# Patient Record
Sex: MALE | Race: WHITE | NOT HISPANIC OR LATINO | URBAN - METROPOLITAN AREA
[De-identification: names, ages, dates, MRNs, and addresses within clinical notes are randomized per-mention and may not be internally consistent; named-entity substitution may affect disease eponyms.]

---

## 2024-09-03 DIAGNOSIS — M25.561 RIGHT KNEE PAIN, UNSPECIFIED CHRONICITY: ICD-10-CM

## 2024-09-04 ENCOUNTER — OFFICE VISIT (OUTPATIENT)
Dept: ORTHOPEDIC SURGERY | Facility: CLINIC | Age: 44
End: 2024-09-04
Payer: COMMERCIAL

## 2024-09-04 ENCOUNTER — HOSPITAL ENCOUNTER (OUTPATIENT)
Dept: RADIOLOGY | Facility: CLINIC | Age: 44
Discharge: HOME | End: 2024-09-04
Payer: COMMERCIAL

## 2024-09-04 DIAGNOSIS — M25.462 EFFUSION OF LEFT KNEE JOINT: Primary | ICD-10-CM

## 2024-09-04 DIAGNOSIS — M25.561 RIGHT KNEE PAIN, UNSPECIFIED CHRONICITY: ICD-10-CM

## 2024-09-04 PROCEDURE — 73564 X-RAY EXAM KNEE 4 OR MORE: CPT | Mod: LEFT SIDE | Performed by: RADIOLOGY

## 2024-09-04 PROCEDURE — 73564 X-RAY EXAM KNEE 4 OR MORE: CPT | Mod: LT

## 2024-09-04 PROCEDURE — 99204 OFFICE O/P NEW MOD 45 MIN: CPT | Performed by: EMERGENCY MEDICINE

## 2024-09-04 RX ORDER — MELOXICAM 15 MG/1
15 TABLET ORAL DAILY
Qty: 30 TABLET | Refills: 0 | Status: SHIPPED | OUTPATIENT
Start: 2024-09-04 | End: 2024-10-04

## 2024-09-04 ASSESSMENT — PAIN DESCRIPTION - DESCRIPTORS: DESCRIPTORS: OTHER (COMMENT);PRESSURE

## 2024-09-04 ASSESSMENT — PAIN SCALES - GENERAL: PAINLEVEL_OUTOF10: 8

## 2024-09-04 ASSESSMENT — PAIN - FUNCTIONAL ASSESSMENT: PAIN_FUNCTIONAL_ASSESSMENT: 0-10

## 2024-09-04 NOTE — PROGRESS NOTES
Subjective    Patient ID: Nawaf Way is a 44 y.o. male.    Chief Complaint: Pain of the Left Knee (NPV - No injury - pain for a few yrs. )     Last Surgery: No surgery found  Last Surgery Date: No surgery found    Nawaf is a very pleasant 44-year-old male coming in with acute on chronic left knee discomfort and swelling.  He played sports in high school but denies a specific traumatic event or known injury.  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  It does not radiate but is associated with knee swelling.  He has some knee swelling here today that has gotten better over the past couple of days since he has been taking occasional ibuprofen and resting.  He would be interested in surgery if indicated.  He denies any infectious symptoms.  No weakness or numbness.  No other complaints or today.        Objective   Right Knee Exam   Right knee exam is normal.      Left Knee Exam     Muscle Strength   The patient has normal left knee strength.    Tenderness   The patient is experiencing no tenderness.     Range of Motion   Extension:  normal   Flexion:  abnormal     Tests   Celio:   Lateral - positive  Varus: negative Valgus: negative  Lachman:  Anterior - negative      Drawer:  Anterior - negative     Posterior - negative  Patellar apprehension: negative    Other   Erythema: absent  Sensation: normal  Pulse: present  Swelling: moderate  Effusion: effusion present    Comments:  Knee extension is intact against resistance            Image Results:  OUTSIDE GENERIC TESTING  Ordered by an unspecified provider.    X-rays of the left knee were reviewed and interpreted by me on 9/4/2024 and were grossly unremarkable without any evidence of acute injuries or fractures.    Assessment/Plan   Encounter Diagnoses:  Effusion of left knee  joint    Orders Placed This Encounter    MR knee left wo IV contrast    Referral to Physical Therapy    meloxicam (Mobic) 15 mg tablet     No follow-ups on file.    We had a long discussion about his treatment options and eventually agreed to start him on meloxicam and refer him to physical therapy.  However because of his knee effusion I am concerned about a potential surgical issue, specifically tearing to his lateral meniscus.  We therefore agreed to obtain an MRI and I will refer him to orthopedic sports medicine, likely Dr. CARLA deutsch.  I will follow-up with him after his MRI.    ** Please excuse any errors in grammar or translation related to this dictation. Voice recognition software was utilized to prepare this document. **       Eusebio Oropeza MD  [facility] Medicine

## 2024-09-09 ENCOUNTER — DOCUMENTATION (OUTPATIENT)
Dept: ORTHOPEDIC SURGERY | Facility: CLINIC | Age: 44
End: 2024-09-09
Payer: COMMERCIAL

## 2024-09-09 NOTE — PROGRESS NOTES
I called and did a peer to peer to get approval for the left knee MRI.  The approval number is [id number] and is good from 9/5/2024 through 10/4/2024.     I updated my , who will inform the patient of the approval.    ** Please excuse any errors in grammar or translation related to this dictation. Voice recognition software was utilized to prepare this document. **       Eusebio Oropeza MD  [facility] Medicine

## 2024-09-18 ENCOUNTER — TELEMEDICINE (OUTPATIENT)
Dept: ORTHOPEDIC SURGERY | Facility: CLINIC | Age: 44
End: 2024-09-18
Payer: COMMERCIAL

## 2024-09-18 ENCOUNTER — HOSPITAL ENCOUNTER (OUTPATIENT)
Dept: RADIOLOGY | Facility: HOSPITAL | Age: 44
Discharge: HOME | End: 2024-09-18
Payer: COMMERCIAL

## 2024-09-18 DIAGNOSIS — S83.242D ACUTE MEDIAL MENISCUS TEAR OF LEFT KNEE, SUBSEQUENT ENCOUNTER: Primary | ICD-10-CM

## 2024-09-18 DIAGNOSIS — M25.462 EFFUSION OF LEFT KNEE JOINT: ICD-10-CM

## 2024-09-18 PROCEDURE — 99441 PR PHYS/QHP TELEPHONE EVALUATION 5-10 MIN: CPT | Performed by: EMERGENCY MEDICINE

## 2024-09-18 PROCEDURE — 73721 MRI JNT OF LWR EXTRE W/O DYE: CPT | Mod: LT

## 2024-09-18 PROCEDURE — 73721 MRI JNT OF LWR EXTRE W/O DYE: CPT | Mod: LEFT SIDE | Performed by: STUDENT IN AN ORGANIZED HEALTH CARE EDUCATION/TRAINING PROGRAM

## 2024-09-18 NOTE — PROGRESS NOTES
Subjective    Patient ID: Nawaf Way is a 44 y.o. male.    Chief Complaint: No chief complaint on file.     Last Surgery: No surgery found  Last Surgery Date: No surgery found    Nawaf is a very pleasant 44-year-old male coming in with acute on chronic left knee discomfort and swelling.  He played sports in high school but denies a specific traumatic event or known injury.  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  It does not radiate but is associated with knee swelling.  He has some knee swelling here today that has gotten better over the past couple of days since he has been taking occasional ibuprofen and resting.  He would be interested in surgery if indicated.  He denies any infectious symptoms.  No weakness or numbness.  No other complaints or today. We agreed to start him on meloxicam and refer him to physical therapy.  However because of his knee effusion I am concerned about a potential surgical issue, specifically tearing to his lateral meniscus.  We therefore agreed to obtain an MRI and I will refer him to orthopedic sports medicine, likely Dr. CARLA deutsch.  I will follow-up with him after his MRI.    Update on 9/18/2024.  Nawaf did a virtual visit with me today with audio capabilities only to discuss his MRI result and follow-up regarding his left knee pain.  His MRI demonstrated a medial meniscus tear.  He is interested in surgery.        Objective   Ortho Exam  No exam, televisit    Image Results:  MR knee left wo IV contrast  Narrative: Interpreted By:  Jarrell Kumari,  and Lesley Cabrera   STUDY:  MRI of the  left knee without IV contrast;  9/18/2024 10:38 am      INDICATION:  Signs/Symptoms:L knee effusion, positive ana testing on the L  side.      ,M25.462 Effusion, left knee      COMPARISON:  Left knee  radiograph 09/04/2024.      ACCESSION NUMBER(S):  [id number]      ORDERING CLINICIAN:  GAURAV PRYOR      TECHNIQUE:  MR imaging of the  left knee was obtained  without IV contrast.      FINDINGS:  LIGAMENTS AND TENDONS:  The anterior cruciate ligament and the posterior cruciate ligaments  are intact. The medial collateral ligament is intact. The lateral  collateral ligament, the biceps femoris tendon, the popliteus tendon  and the iliotibial band are intact. The quadriceps tendon and the  patellar tendon are intact.      MENISCI:  Complex tear involving the posterior horn and body of the medial  meniscus with horizontal and radial components. There is a  parameniscal cyst emanating inferiorly adjacent to the medial tibial  plateau The lateral meniscus is intact and without evidence of tear.      JOINTS:  Mild diffuse articular cartilage thinning of the medial femoral  condyle and medial tibial plateau without evidence of full thickness  defect. Focal mild to moderate articular cartilage thinning involving  the anterior weight-bearing surface of the lateral femoral condyle.  The articular cartilage of the lateral tibial plateau is intact. No  full-thickness defect. The patellofemoral articulation is intact and  without evidence of subluxation or articular cartilage defect.  Moderate-sized joint effusion. No significant popliteal cyst. Mild  prepatellar soft tissue edema.      OSSEOUS STRUCTURES:  No focal marrow replacing lesions are identified. There is no  fracture.      SOFT TISSUES:  There is no muscle atrophy or tear.  The common peroneal nerve is intact.      Impression: 1. Horizontal tear involving the posterior horn and body of the  medial meniscus. There is an adjacent parameniscal cyst.  2. Moderate-sized knee joint effusion.  3. Mild degenerative changes of the knee joint with superimposed  focal mild to moderate cartilage thinning at the lateral joint  compartment.  4. Mild prepatellar soft tissue  edema.      I personally reviewed the images/study and I agree with the findings  as stated by Rick Sutton MD. This study was interpreted at  [facility], [place], [place]      MACRO:  None      Signed by: Jarrell Kumari 9/18/2024 11:36 AM  Dictation workstation:   [id number]    MRI of the left knee was reviewed and interpreted by me on 9/18/2024.  I agree with the radiologist's findings and interpretations.    Assessment/Plan   Encounter Diagnoses:  Acute medial meniscus tear of left knee, subsequent encounter    No orders of the defined types were placed in this encounter.    No follow-ups on file.    We discussed his treatment options and decided to refer him to orthopedic surgery to see Dr. Proctor he will follow-up with me as needed moving forward.[email or web address].     I spent 7 min with the patient reviewing his imaging results and the chart.  Greater than 50% of the time was spent with the patient discussing the results and coming up with the treatment plan.    ** Please excuse any errors in grammar or translation related to this dictation. Voice recognition software was utilized to prepare this document. **       Eusebio Oropeza MD  [facility] Sports Medicine

## 2024-09-19 ENCOUNTER — APPOINTMENT (OUTPATIENT)
Dept: RADIOLOGY | Facility: CLINIC | Age: 44
End: 2024-09-19
Payer: COMMERCIAL

## 2024-09-24 ENCOUNTER — TELEPHONE (OUTPATIENT)
Dept: ORTHOPEDIC SURGERY | Facility: HOSPITAL | Age: 44
End: 2024-09-24
Payer: COMMERCIAL

## 2024-09-24 NOTE — TELEPHONE ENCOUNTER
I left a voicemail for Mr. Way to call the office back to r/s his appointment to the morning of 9/26/24 or to a different day because the doctor has emergent surgery in the afternoon on 9/26/24.

## 2024-09-26 ENCOUNTER — APPOINTMENT (OUTPATIENT)
Dept: ORTHOPEDIC SURGERY | Facility: CLINIC | Age: 44
End: 2024-09-26
Payer: COMMERCIAL

## 2024-09-26 ENCOUNTER — OFFICE VISIT (OUTPATIENT)
Dept: ORTHOPEDIC SURGERY | Facility: CLINIC | Age: 44
End: 2024-09-26
Payer: COMMERCIAL

## 2024-09-26 DIAGNOSIS — M23.322 MEDIAL MENISCUS, POSTERIOR HORN DERANGEMENT, LEFT: Primary | ICD-10-CM

## 2024-09-26 PROCEDURE — 99204 OFFICE O/P NEW MOD 45 MIN: CPT | Performed by: STUDENT IN AN ORGANIZED HEALTH CARE EDUCATION/TRAINING PROGRAM

## 2024-09-26 ASSESSMENT — PAIN - FUNCTIONAL ASSESSMENT: PAIN_FUNCTIONAL_ASSESSMENT: 0-10

## 2024-09-26 ASSESSMENT — PAIN SCALES - GENERAL: PAINLEVEL_OUTOF10: 0 - NO PAIN

## 2024-10-08 ENCOUNTER — PREP FOR PROCEDURE (OUTPATIENT)
Dept: ORTHOPEDIC SURGERY | Facility: CLINIC | Age: 44
End: 2024-10-08
Payer: COMMERCIAL

## 2024-10-08 PROBLEM — M23.322 MEDIAL MENISCUS, POSTERIOR HORN DERANGEMENT, LEFT: Status: ACTIVE | Noted: 2024-09-26

## 2024-10-09 NOTE — PROGRESS NOTES
PRIMARY CARE PHYSICIAN: No primary care provider on file.  REFERRING PROVIDER: No referring provider defined for this encounter.     CONSULT ORTHOPAEDIC: Knee Evaluation    ASSESSMENT & PLAN    Impression/Plan: This is a 44-year-old male presenting for evaluation of acute on chronic left knee pain, discomfort, and swelling.  Based on clinical history, physical examination, and MRI he is suffering from a medial meniscus posterior horn meniscal tear.  At this time based on his mechanical symptoms I recommended operative intervention.  Risks of the surgery include but are not limited to blood loss, infection, nerve damage, persistent pain, versus instability.  At this time, the patient would like to move forward with surgery.        SUBJECTIVE  CHIEF COMPLAINT:   Chief Complaint   Patient presents with    Left Knee - Pain     NPV - No injury had MRI done - Ref by Dr. Oropeza.          HPI: Nawaf aWy is a 44 y.o. patient. The patient presents for evaluation of acute on chronic left knee medial sided pain and mechanical symptoms.  He does a lot of work manual labor and reports that after a twisting injury his pain reaches a 5/10.  He does have mechanical symptoms of clicking, popping and locking.  He has pain over the medial joint line.  No previous history of knee surgery, injections, or traumatic injury.  He denies any distal numbness or tingling.      REVIEW OF SYSTEMS  Constitutional: See HPI for pain assessment, No significant weight loss, recent trauma  Cardiovascular: No chest pain, shortness of breath  Respiratory: No difficulty breathing, cough  Gastrointestinal: No nausea, vomiting, diarrhea, constipation  Musculoskeletal: Noted in HPI, positive for pain, restricted motion, stiffness  Integumentary: No rashes, easy bruising, redness   Neurological: no numbness or tingling in extremities, no gait disturbances   Psychiatric: No mood changes, memory changes, social issues  Heme/Lymph: no excessive  "swelling, easy bruising, excessive bleeding  ENT: No hearing changes  Eyes: No vision changes    No past medical history on file.     No Known Allergies     Past Surgical History:   Procedure Laterality Date    OTHER SURGICAL HISTORY  11/05/2020    Vasectomy        No family history on file.     Social History     Socioeconomic History    Marital status:      Spouse name: Not on file    Number of children: Not on file    Years of education: Not on file    Highest education level: Not on file   Occupational History    Not on file   Tobacco Use    Smoking status: Not on file    Smokeless tobacco: Not on file   Substance and Sexual Activity    Alcohol use: Not on file    Drug use: Not on file    Sexual activity: Not on file   Other Topics Concern    Not on file   Social History Narrative    Not on file     Social Determinants of Health     Financial Resource Strain: Not on file   Food Insecurity: Not on file   Transportation Needs: Not on file   Physical Activity: Not on file   Stress: Not on file   Social Connections: Not on file   Intimate Partner Violence: Not on file   Housing Stability: Not on file        CURRENT MEDICATIONS:   No current outpatient medications on file.     No current facility-administered medications for this visit.        OBJECTIVE    PHYSICAL EXAM      11/5/2020     3:41 PM 12/2/2020    10:40 AM   Vitals   Systolic 112 108   Diastolic 78 70   Heart Rate 107 79   Height (in) 1.778 m (5' 10\") 1.778 m (5' 10\")   Weight (lb) 206.38 207.13   BMI 29.61 kg/m2 29.72 kg/m2   BSA (m2) 2.15 m2 2.15 m2      There is no height or weight on file to calculate BMI.    GENERAL: A/Ox3, NAD. Appears healthy, well nourished  PSYCHIATRIC: Mood stable, appropriate memory recall  EYES: EOM intact, no scleral icterus  CARDIAC: regular rate  LUNGS: Breathing non-labored  SKIN: no erythema, rashes, or ecchymoses     MUSCULOSKELETAL:  Laterality: left Knee Exam  This is a well-appearing patient in no acute " distress.  There is mild effusion to the knee.  There is moderate tenderness to palpation along the medial joint line, no with pain along the medial joint line and terminal flexion.  Tenderness to palpation along lateral joint line.  Range of motion: 0 to 120 degrees with pain along the medial joint line and terminal flexion.  There is no pain with manipulation of patella.  Negative Lachman's exam.  The knee is stable to posterior stress.  The knee is stable to varus and valgus stress at both 0 and 30 degrees knee flexion.  5/5 Strength TA, EHL, GS    NEUROVASCULAR:  - Neurovascular Status: sensation intact to light touch distally, lower extremity motor intact  - Capillary refill brisk at extremities, Bilateral dorsalis pedis pulse 2+    Imaging:  MRI of the left knee dated 9/18/2024 reveals horizontal tearing the posterior horn the medial meniscus.  No evidence of ACL, PCL, LCL, or MCL instability.          Piter Miller MD, MPH  Attending Surgeon  Sports Medicine Orthopaedic Surgery  [facility] [place]

## 2024-10-29 ENCOUNTER — ANESTHESIA EVENT (OUTPATIENT)
Dept: OPERATING ROOM | Facility: CLINIC | Age: 44
End: 2024-10-29
Payer: COMMERCIAL

## 2024-10-30 ENCOUNTER — HOSPITAL ENCOUNTER (OUTPATIENT)
Facility: CLINIC | Age: 44
Setting detail: OUTPATIENT SURGERY
Discharge: HOME | End: 2024-10-30
Attending: STUDENT IN AN ORGANIZED HEALTH CARE EDUCATION/TRAINING PROGRAM | Admitting: STUDENT IN AN ORGANIZED HEALTH CARE EDUCATION/TRAINING PROGRAM
Payer: COMMERCIAL

## 2024-10-30 ENCOUNTER — ANESTHESIA (OUTPATIENT)
Dept: OPERATING ROOM | Facility: CLINIC | Age: 44
End: 2024-10-30
Payer: COMMERCIAL

## 2024-10-30 VITALS
BODY MASS INDEX: 30.8 KG/M2 | WEIGHT: 215.17 LBS | HEIGHT: 70 IN | OXYGEN SATURATION: 100 % | RESPIRATION RATE: 16 BRPM | HEART RATE: 55 BPM | SYSTOLIC BLOOD PRESSURE: 115 MMHG | DIASTOLIC BLOOD PRESSURE: 84 MMHG | TEMPERATURE: 96.8 F

## 2024-10-30 DIAGNOSIS — G89.18 POSTOPERATIVE PAIN: ICD-10-CM

## 2024-10-30 DIAGNOSIS — M23.322 MEDIAL MENISCUS, POSTERIOR HORN DERANGEMENT, LEFT: Primary | ICD-10-CM

## 2024-10-30 PROCEDURE — 7100000002 HC RECOVERY ROOM TIME - EACH INCREMENTAL 1 MINUTE: Performed by: STUDENT IN AN ORGANIZED HEALTH CARE EDUCATION/TRAINING PROGRAM

## 2024-10-30 PROCEDURE — 2500000004 HC RX 250 GENERAL PHARMACY W/ HCPCS (ALT 636 FOR OP/ED): Performed by: STUDENT IN AN ORGANIZED HEALTH CARE EDUCATION/TRAINING PROGRAM

## 2024-10-30 PROCEDURE — 2720000007 HC OR 272 NO HCPCS: Performed by: STUDENT IN AN ORGANIZED HEALTH CARE EDUCATION/TRAINING PROGRAM

## 2024-10-30 PROCEDURE — 7100000009 HC PHASE TWO TIME - INITIAL BASE CHARGE: Performed by: STUDENT IN AN ORGANIZED HEALTH CARE EDUCATION/TRAINING PROGRAM

## 2024-10-30 PROCEDURE — 7100000010 HC PHASE TWO TIME - EACH INCREMENTAL 1 MINUTE: Performed by: STUDENT IN AN ORGANIZED HEALTH CARE EDUCATION/TRAINING PROGRAM

## 2024-10-30 PROCEDURE — 3600000004 HC OR TIME - INITIAL BASE CHARGE - PROCEDURE LEVEL FOUR: Performed by: STUDENT IN AN ORGANIZED HEALTH CARE EDUCATION/TRAINING PROGRAM

## 2024-10-30 PROCEDURE — 3700000001 HC GENERAL ANESTHESIA TIME - INITIAL BASE CHARGE: Performed by: STUDENT IN AN ORGANIZED HEALTH CARE EDUCATION/TRAINING PROGRAM

## 2024-10-30 PROCEDURE — 3600000009 HC OR TIME - EACH INCREMENTAL 1 MINUTE - PROCEDURE LEVEL FOUR: Performed by: STUDENT IN AN ORGANIZED HEALTH CARE EDUCATION/TRAINING PROGRAM

## 2024-10-30 PROCEDURE — A29881 PR KNEE SCOPE,MED/LAT MENISECTOMY: Performed by: NURSE ANESTHETIST, CERTIFIED REGISTERED

## 2024-10-30 PROCEDURE — 2500000004 HC RX 250 GENERAL PHARMACY W/ HCPCS (ALT 636 FOR OP/ED): Performed by: NURSE ANESTHETIST, CERTIFIED REGISTERED

## 2024-10-30 PROCEDURE — A29881 PR KNEE SCOPE,MED/LAT MENISECTOMY: Performed by: ANESTHESIOLOGY

## 2024-10-30 PROCEDURE — 3700000002 HC GENERAL ANESTHESIA TIME - EACH INCREMENTAL 1 MINUTE: Performed by: STUDENT IN AN ORGANIZED HEALTH CARE EDUCATION/TRAINING PROGRAM

## 2024-10-30 PROCEDURE — 29881 ARTHRS KNE SRG MNISECTMY M/L: CPT | Performed by: STUDENT IN AN ORGANIZED HEALTH CARE EDUCATION/TRAINING PROGRAM

## 2024-10-30 PROCEDURE — 7100000001 HC RECOVERY ROOM TIME - INITIAL BASE CHARGE: Performed by: STUDENT IN AN ORGANIZED HEALTH CARE EDUCATION/TRAINING PROGRAM

## 2024-10-30 RX ORDER — ACETAMINOPHEN 325 MG/1
650 TABLET ORAL EVERY 4 HOURS PRN
Status: DISCONTINUED | OUTPATIENT
Start: 2024-10-30 | End: 2024-10-30 | Stop reason: HOSPADM

## 2024-10-30 RX ORDER — ONDANSETRON HYDROCHLORIDE 2 MG/ML
4 INJECTION, SOLUTION INTRAVENOUS ONCE AS NEEDED
Status: DISCONTINUED | OUTPATIENT
Start: 2024-10-30 | End: 2024-10-30 | Stop reason: HOSPADM

## 2024-10-30 RX ORDER — SODIUM CHLORIDE, SODIUM LACTATE, POTASSIUM CHLORIDE, CALCIUM CHLORIDE 600; 310; 30; 20 MG/100ML; MG/100ML; MG/100ML; MG/100ML
INJECTION, SOLUTION INTRAVENOUS CONTINUOUS PRN
Status: DISCONTINUED | OUTPATIENT
Start: 2024-10-30 | End: 2024-10-30

## 2024-10-30 RX ORDER — ONDANSETRON 4 MG/1
4 TABLET, FILM COATED ORAL EVERY 8 HOURS PRN
Qty: 20 TABLET | Refills: 0 | Status: SHIPPED | OUTPATIENT
Start: 2024-10-30 | End: 2024-11-06

## 2024-10-30 RX ORDER — DIPHENHYDRAMINE HYDROCHLORIDE 50 MG/ML
12.5 INJECTION INTRAMUSCULAR; INTRAVENOUS ONCE AS NEEDED
Status: DISCONTINUED | OUTPATIENT
Start: 2024-10-30 | End: 2024-10-30 | Stop reason: HOSPADM

## 2024-10-30 RX ORDER — ASPIRIN 325 MG
325 TABLET, DELAYED RELEASE (ENTERIC COATED) ORAL DAILY
Qty: 14 TABLET | Refills: 0 | Status: SHIPPED | OUTPATIENT
Start: 2024-10-30

## 2024-10-30 RX ORDER — SODIUM CHLORIDE, SODIUM LACTATE, POTASSIUM CHLORIDE, CALCIUM CHLORIDE 600; 310; 30; 20 MG/100ML; MG/100ML; MG/100ML; MG/100ML
100 INJECTION, SOLUTION INTRAVENOUS CONTINUOUS
Status: SHIPPED | OUTPATIENT
Start: 2024-10-30 | End: 2024-10-30

## 2024-10-30 RX ORDER — ALBUTEROL SULFATE 0.83 MG/ML
2.5 SOLUTION RESPIRATORY (INHALATION) ONCE AS NEEDED
Status: DISCONTINUED | OUTPATIENT
Start: 2024-10-30 | End: 2024-10-30 | Stop reason: HOSPADM

## 2024-10-30 RX ORDER — DOCUSATE SODIUM 100 MG/1
100 CAPSULE, LIQUID FILLED ORAL 2 TIMES DAILY PRN
Qty: 14 CAPSULE | Refills: 0 | Status: SHIPPED | OUTPATIENT
Start: 2024-10-30 | End: 2024-11-06

## 2024-10-30 RX ORDER — SODIUM CHLORIDE, SODIUM LACTATE, POTASSIUM CHLORIDE, AND CALCIUM CHLORIDE .6; .31; .03; .02 G/100ML; G/100ML; G/100ML; G/100ML
IRRIGANT IRRIGATION AS NEEDED
Status: DISCONTINUED | OUTPATIENT
Start: 2024-10-30 | End: 2024-10-30 | Stop reason: HOSPADM

## 2024-10-30 RX ORDER — ONDANSETRON 4 MG/1
4 TABLET, FILM COATED ORAL EVERY 8 HOURS PRN
Qty: 21 TABLET | Refills: 0 | Status: SHIPPED | OUTPATIENT
Start: 2024-10-30 | End: 2024-11-06

## 2024-10-30 RX ORDER — OXYCODONE HYDROCHLORIDE 5 MG/1
5 TABLET ORAL EVERY 6 HOURS PRN
Qty: 29 TABLET | Refills: 0 | Status: SHIPPED | OUTPATIENT
Start: 2024-10-30 | End: 2024-11-06

## 2024-10-30 RX ORDER — NAPROXEN SODIUM 220 MG/1
81 TABLET, FILM COATED ORAL 2 TIMES DAILY
Qty: 56 TABLET | Refills: 0 | Status: SHIPPED | OUTPATIENT
Start: 2024-10-30 | End: 2024-11-27

## 2024-10-30 RX ORDER — GLYCOPYRROLATE 0.2 MG/ML
INJECTION INTRAMUSCULAR; INTRAVENOUS AS NEEDED
Status: DISCONTINUED | OUTPATIENT
Start: 2024-10-30 | End: 2024-10-30

## 2024-10-30 RX ORDER — OXYCODONE HYDROCHLORIDE 5 MG/1
5 TABLET ORAL EVERY 4 HOURS PRN
Status: DISCONTINUED | OUTPATIENT
Start: 2024-10-30 | End: 2024-10-30 | Stop reason: HOSPADM

## 2024-10-30 RX ORDER — ONDANSETRON HYDROCHLORIDE 2 MG/ML
INJECTION, SOLUTION INTRAVENOUS AS NEEDED
Status: DISCONTINUED | OUTPATIENT
Start: 2024-10-30 | End: 2024-10-30

## 2024-10-30 RX ORDER — DOCUSATE SODIUM 100 MG/1
100 CAPSULE, LIQUID FILLED ORAL 2 TIMES DAILY
Qty: 60 CAPSULE | Refills: 0 | Status: SHIPPED | OUTPATIENT
Start: 2024-10-30 | End: 2024-11-29

## 2024-10-30 RX ORDER — LIDOCAINE HYDROCHLORIDE 10 MG/ML
INJECTION, SOLUTION INFILTRATION; PERINEURAL AS NEEDED
Status: DISCONTINUED | OUTPATIENT
Start: 2024-10-30 | End: 2024-10-30 | Stop reason: HOSPADM

## 2024-10-30 RX ORDER — BUPIVACAINE HYDROCHLORIDE 2.5 MG/ML
INJECTION, SOLUTION EPIDURAL; INFILTRATION; INTRACAUDAL AS NEEDED
Status: DISCONTINUED | OUTPATIENT
Start: 2024-10-30 | End: 2024-10-30 | Stop reason: HOSPADM

## 2024-10-30 RX ORDER — LIDOCAINE HYDROCHLORIDE 10 MG/ML
0.1 INJECTION, SOLUTION EPIDURAL; INFILTRATION; INTRACAUDAL; PERINEURAL ONCE
Status: DISCONTINUED | OUTPATIENT
Start: 2024-10-30 | End: 2024-10-30 | Stop reason: HOSPADM

## 2024-10-30 RX ORDER — LABETALOL HYDROCHLORIDE 5 MG/ML
5 INJECTION, SOLUTION INTRAVENOUS ONCE AS NEEDED
Status: DISCONTINUED | OUTPATIENT
Start: 2024-10-30 | End: 2024-10-30 | Stop reason: HOSPADM

## 2024-10-30 RX ORDER — KETOROLAC TROMETHAMINE 30 MG/ML
INJECTION, SOLUTION INTRAMUSCULAR; INTRAVENOUS AS NEEDED
Status: DISCONTINUED | OUTPATIENT
Start: 2024-10-30 | End: 2024-10-30

## 2024-10-30 RX ORDER — METHOCARBAMOL 100 MG/ML
INJECTION, SOLUTION INTRAMUSCULAR; INTRAVENOUS AS NEEDED
Status: DISCONTINUED | OUTPATIENT
Start: 2024-10-30 | End: 2024-10-30

## 2024-10-30 RX ORDER — HYDROMORPHONE HYDROCHLORIDE 1 MG/ML
0.2 INJECTION, SOLUTION INTRAMUSCULAR; INTRAVENOUS; SUBCUTANEOUS EVERY 5 MIN PRN
Status: DISCONTINUED | OUTPATIENT
Start: 2024-10-30 | End: 2024-10-30 | Stop reason: HOSPADM

## 2024-10-30 RX ORDER — MIDAZOLAM HYDROCHLORIDE 1 MG/ML
INJECTION, SOLUTION INTRAMUSCULAR; INTRAVENOUS AS NEEDED
Status: DISCONTINUED | OUTPATIENT
Start: 2024-10-30 | End: 2024-10-30

## 2024-10-30 RX ORDER — LIDOCAINE HYDROCHLORIDE 20 MG/ML
INJECTION, SOLUTION INFILTRATION; PERINEURAL AS NEEDED
Status: DISCONTINUED | OUTPATIENT
Start: 2024-10-30 | End: 2024-10-30

## 2024-10-30 RX ORDER — PROPOFOL 10 MG/ML
INJECTION, EMULSION INTRAVENOUS AS NEEDED
Status: DISCONTINUED | OUTPATIENT
Start: 2024-10-30 | End: 2024-10-30

## 2024-10-30 RX ORDER — HYDROMORPHONE HYDROCHLORIDE 1 MG/ML
0.5 INJECTION, SOLUTION INTRAMUSCULAR; INTRAVENOUS; SUBCUTANEOUS EVERY 5 MIN PRN
Status: DISCONTINUED | OUTPATIENT
Start: 2024-10-30 | End: 2024-10-30 | Stop reason: HOSPADM

## 2024-10-30 RX ORDER — FENTANYL CITRATE 50 UG/ML
INJECTION, SOLUTION INTRAMUSCULAR; INTRAVENOUS AS NEEDED
Status: DISCONTINUED | OUTPATIENT
Start: 2024-10-30 | End: 2024-10-30

## 2024-10-30 RX ORDER — OXYCODONE HYDROCHLORIDE 5 MG/1
5 TABLET ORAL EVERY 4 HOURS PRN
Qty: 15 TABLET | Refills: 0 | Status: SHIPPED | OUTPATIENT
Start: 2024-10-30 | End: 2024-11-06

## 2024-10-30 RX ORDER — ACETAMINOPHEN 500 MG
1000 TABLET ORAL EVERY 8 HOURS PRN
Qty: 180 TABLET | Refills: 0 | Status: SHIPPED | OUTPATIENT
Start: 2024-10-30 | End: 2024-11-29

## 2024-10-30 RX ORDER — CEFAZOLIN 1 G/1
INJECTION, POWDER, FOR SOLUTION INTRAVENOUS AS NEEDED
Status: DISCONTINUED | OUTPATIENT
Start: 2024-10-30 | End: 2024-10-30

## 2024-10-30 ASSESSMENT — PAIN - FUNCTIONAL ASSESSMENT
PAIN_FUNCTIONAL_ASSESSMENT: 0-10

## 2024-10-30 ASSESSMENT — PAIN DESCRIPTION - DESCRIPTORS: DESCRIPTORS: SORE

## 2024-10-30 ASSESSMENT — PAIN SCALES - GENERAL
PAINLEVEL_OUTOF10: 0 - NO PAIN
PAINLEVEL_OUTOF10: 0 - NO PAIN
PAINLEVEL_OUTOF10: 4
PAINLEVEL_OUTOF10: 0 - NO PAIN
PAINLEVEL_OUTOF10: 0 - NO PAIN

## 2024-10-30 ASSESSMENT — COLUMBIA-SUICIDE SEVERITY RATING SCALE - C-SSRS
2. HAVE YOU ACTUALLY HAD ANY THOUGHTS OF KILLING YOURSELF?: NO
1. IN THE PAST MONTH, HAVE YOU WISHED YOU WERE DEAD OR WISHED YOU COULD GO TO SLEEP AND NOT WAKE UP?: NO
6. HAVE YOU EVER DONE ANYTHING, STARTED TO DO ANYTHING, OR PREPARED TO DO ANYTHING TO END YOUR LIFE?: NO

## 2024-10-30 NOTE — DISCHARGE INSTRUCTIONS
Post Op Instructions: Knee/Lower Extremity  Piter Miller M.D., M.P.H.  Office Phone: [phone]  After Hours Line: [phone]      First PT Appointment:   Will discuss.  First Post Op Appointment:  11/12/2024 at 110PM [facility]     If you have any questions, please read this information in its entirety, and then call with any questions.        MEDICATIONS PRESCRIBED FOR AFTER SURGERY:  Your preferred pharmacy on file is where your medications have been ePrescribed: Please  ASAP  FOR PAIN RELIEF (Narcotics):    You have been provided pain medication after your surgery, please take as directed. Wean off of narcotics as soon as symptoms allow. Take with food. Notify office if nausea, vomiting, headaches, or rash occurs. Other side effects include; drowsiness and constipation.    Use caution when taking TYLENOL or other acetaminophen products while taking Percocet, Norco, or Lorcet as these medications already contain acetaminophen. Do NOT exceed more than 3000mg of Tylenol per day.    We do NOT recommend ibuprofen, Advil, Aleve, Motrin, or NSAID products for 6wks after surgery, as these can delay healing.    You should not drive while taking pain medications as they delay your responses.    Narcotics are addictive and have a high abuse and overdose potential. It is extremely important to take these as directed and not to mix with alcohol or other forms of medication unless approved by the medical team. Please keep all prescribed narcotics LOCKED and away from children. Lastly, please properly dispose of leftover narcotics. Contact your local police department for more info.   o OXYCODONE 5 mg: this is a short-acting medication for pain. You should take 1 or 2 tablets every 4 to 6 hours AS NEEDED. If you are not in pain, you should not take this medication. Try to wean down your use of this drug as soon as symptoms allow. If 2 tablets every 4 hours are not relieving all your pain please call our  office or the MD on Call.     FOR NAUSEA:   o ZOFRAN (Odansteron) 4mg Tablet - this medication is for nausea or vomiting. Place 1 tablet under the tongue every 8 hours as needed for nausea. If you are not nauseous, you do not need to take this medication. Please call our office if you still experience nausea despite taking this medication.    FOR ANTI-COAGULATION (Blood Thinners):   You have been prescribed an anticoagulation medication, to be taken after surgery. This medication is taken to prevent a blood clot from developing in your leg, which is a possible complication after any surgery. This medication is required after all surgeries. You must finish the entire prescription of anticoagulation medication, no matter what type of procedure you had.   o ASPIRIN 325 mg: This is a mild blood thinner. Please take 1 tablet every day as instructed weeks: starting the day after surgery, no matter what kind of procedure you had. Finish the entire prescription bottle, even if you are feeling better.     OTHER MEDICATIONS TO CONSIDER:   o TYLENOL: You can buy this Over The Counter. Some pain medications contain Tylenol, including Norco, Lorcet, and Percocet. Oxycodone and Dilaudid DO NOT contain Tylenol, and it is recommended to add in Tylenol for additional pain control if needed. This can be taken as 325-600mg every 4 hours. The maximum dose for Tylenol per day is 3000mg.   o MIRALAX, COLACE, SENNA OR DOCUSATE: These are over-the-counter mild laxatives and stool softeners for prevention of constipation. We suggest beginning these the day after surgery if you are taking narcotics. Please call the office if you have not had a bowel movement for three days after your surgery.   o BENADRYL: Itching can be common when taking narcotics. Take this as needed for any itching symptoms. Can be found overthe-counter.    DRESSING/BANDAGE CHANGES:   You may change your surgical dressing three days after surgery. Keep your incisions  covered daily until your first post-op visit.      OTHER GENERAL SURGERY INFORMATION  ICE: Swelling and bruising is normal after surgery because fluids are used during surgery. Continuous icing will help to decrease swelling and pain. It is best to ice at least 5-6 times a day for 20-30 minutes. It is very important to always have a protective  between the ice and your skin. You may use ice bags, frozen wraps, frozen peas or a NICE or Game Ready unit (an ice/compression machine). If you have received a NICE machine and have any questions regarding its use, please call the number provided with the machine.    DRIVING: Please do not drive until you are evaluated in the office after surgery. You are considered an impaired  following surgery, and if you choose to drive, your insurance may not cover any accidents that occur.     PHYSICAL THERAPY: This is dependent on your injury and specific procedure. You will be given specific protocol after your surgery.    ACTIVITY RESTRICTIONS/BRACE/SLING: This is dependent on your injury and specific procedure. You may be required to use a brace or sling. The type of surgery you had will dictate how long to wear your brace/sling. Your PT protocol will outline any restrictions you may have with lifting and range of motion. If you are placed in a sling/brace, it is extremely important to use as directed and make sure you always have the sling on when ambulating (walking). It is important that you follow all instructions regarding activity restrictions as they are intended to promote healing and prevent complications. You may take the brace/sling off if you need to change the bandages, change clothes, or shower (be extra cautious!), or if you are sitting. We recommend wearing the brace even while you are sleeping unless told otherwise y our team.     SIGNS AND SYMPTOMS OF COMPLICATIONS: Although complications are rare the following are a list of concerns you should be  aware of:    Infection - increased pain not relieved with medication, fever, chills, redness, swelling or drainage from incision.    Blood Clot - swelling, tenderness, or calf pain to touch or when you move your ankle up and down, shortness of breath and chest pain.    REASONS TO CALL:   ? Fever, chills or sweats   ? Redness, swelling or warmth around the incisions, non-clear drainage from the incision or increased pain in or around the incision   ? Calf swelling, redness, pain or warmth   ? Chest pain, difficulty breathing, or cough   ? Inability to have a bowel movement after 3 days

## 2024-10-30 NOTE — BRIEF OP NOTE
Date: 10/30/2024  OR Location: [facility] OR    Name: Nawaf Way, : 1980, Age: 44 y.o., MRN: [id number], Sex: male    Diagnosis  Pre-op Diagnosis      * Medial meniscus, posterior horn derangement, left [M23.322] Post-op Diagnosis     * Medial meniscus, posterior horn derangement, left [M23.322]     Procedures  Meniscectomy Arthroscopy Left Knee  [address] - [facility] KNE SURG W/MENISCECTOMY MED/LAT W/SHVG      Surgeons      * Ryan Miller - Primary    Resident/Fellow/Other Assistant:  Surgeons and Role:     * Ryan Hernandez MD - Resident - Assisting    Staff:   Circulator: Sree Morrissey Person: Radha    Anesthesia Staff: Anesthesiologist: Catalina Tyler MD; Vero Jin MD  CRNA: MICHELLE Kulkarni-CRNA  C-AA: TYRA Larsen    Procedure Summary  Anesthesia: General  ASA: I  Estimated Blood Loss: 5mL  Intra-op Medications:   Administrations occurring from 0935 to 1100 on 10/30/24:   Medication Name Total Dose   glycopyrrolate (Robinul) injection 0.1 mg   midazolam (Versed) 1 mg/1 mL 2 mg              Anesthesia Record               Intraprocedure I/O Totals          Intake    LR infusion 600.00 mL    Total Intake 600 mL       Output    Est. Blood Loss 5 mL    Total Output 5 mL       Net    Net Volume 595 mL          Specimen: No specimens collected         Findings: See op report.    Complications:  None; patient tolerated the procedure well.     Disposition: PACU - hemodynamically stable.  Condition: stable  Specimens Collected: No specimens collected  Attending Attestation: I was present and scrubbed for the key portions of the procedure.    Ryan Miller  Phone Number: [phone]

## 2024-10-30 NOTE — H&P
"History Of Present Illness  Nawaf Way is a 44 y.o. male presenting with L knee medial meniscus tear.     Past Medical History  He has no past medical history on file.    Surgical History  He has a past surgical history that includes Other surgical history (11/05/2020) and Dental surgery (N/A).     Social History  He reports that he has never smoked. He has never used smokeless tobacco. He reports that he does not currently use alcohol. No history on file for drug use.    Family History  No family history on file.     Allergies  Patient has no known allergies.    Review of Systems   All other systems reviewed and are negative.       Physical Exam  Vitals reviewed.          Last Recorded Vitals  Blood pressure 129/86, pulse 66, temperature 36 °C (96.8 °F), temperature source Temporal, resp. rate 18, height 1.778 m (5' 10\"), weight 97.6 kg (215 lb 2.7 oz), SpO2 99%.    Relevant Results      Scheduled medications    Continuous medications    PRN medications    No results found for this or any previous visit (from the past 24 hours).    Assessment/Plan   Assessment & Plan  Medial meniscus, posterior horn derangement, left      Plan for L knee arthroscopy, partial medial meniscectomy. Patient would like to proceed.           Ryan Hernandez MD    "

## 2024-10-31 ASSESSMENT — PAIN SCALES - GENERAL: PAINLEVEL_OUTOF10: 0 - NO PAIN

## 2024-11-01 ENCOUNTER — TELEPHONE (OUTPATIENT)
Dept: ORTHOPEDIC SURGERY | Facility: HOSPITAL | Age: 44
End: 2024-11-01
Payer: COMMERCIAL

## 2024-11-05 ENCOUNTER — APPOINTMENT (OUTPATIENT)
Dept: PHYSICAL THERAPY | Facility: CLINIC | Age: 44
End: 2024-11-05
Payer: COMMERCIAL

## 2024-11-10 NOTE — OP NOTE
Meniscectomy Arthroscopy Left Knee (L) Operative Note     Date: 10/30/2024  [place] [facility] [place]    Name: Nawaf Way, : 1980, Age: 44 y.o., MRN: [id number], Sex: male    Diagnosis  Pre-op Diagnosis      * Medial meniscus, posterior horn derangement, left [M23.322] Post-op Diagnosis     * Medial meniscus, posterior horn derangement, left [M23.322]     Procedures  Meniscectomy Arthroscopy Left Knee  44977 - [place] ARTHRS KNE SURG W/MENISCECTOMY MED/LAT W/SHVG      Surgeons      * Ryan Miller - Primary    Resident/Fellow/Other Assistant:  Surgeons and Role:     * Ryan Hernandez MD - Resident - Assisting    Staff:   Circulator: Sree Morrissey Person: Radha    Anesthesia Staff: Anesthesiologist: Catalina Tyler MD; Vero Jin MD  CRNA: MICHELLE Kulkarni-CRNA  C-AA: TYRA Larsen    Procedure Summary  Anesthesia: General  ASA: I  Estimated Blood Loss: minimal mL  Intra-op Medications:   Administrations occurring from 0935 to 1100 on 10/30/24:   Medication Name Total Dose   glycopyrrolate (Robinul) injection 0.1 mg   midazolam (Versed) 1 mg/1 mL 2 mg              Anesthesia Record               Intraprocedure I/O Totals          Intake    LR infusion 600.00 mL    Total Intake 600 mL       Output    Est. Blood Loss 5 mL    Total Output 5 mL       Net    Net Volume 595 mL          Specimen: No specimens collected              Drains and/or Catheters: * None in log *    Tourniquet Times:     Total Tourniquet Time Documented:  Thigh (Left) - 39 minutes  Total: Thigh (Left) - 39 minutes      Implants: none    Indications: Nawaf Way is an 44 y.o. male who is having surgery for Medial meniscus, posterior horn derangement, left [M23.322]. This is a 44-year-old male who presented to the outpatient clinic for evaluation of acute on chronic left knee pain, discomfort, and swelling. Based on clinical history, physical examination, and MRI he is suffering from a medial meniscus  posterior horn meniscal tear. At this time based on his mechanical symptoms I recommended operative intervention. Risks of the surgery include but are not limited to blood loss, infection, nerve damage, persistent pain, versus instability. At this time, the patient would like to move forward with surgery.     The patient was seen in the preoperative area. The risks, benefits, complications, treatment options, non-operative alternatives, expected recovery and outcomes were discussed with the patient. The possibilities of reaction to medication, pulmonary aspiration, injury to surrounding structures, bleeding, recurrent infection, the need for additional procedures, failure to diagnose a condition, and creating a complication requiring transfusion or operation were discussed with the patient. The patient concurred with the proposed plan, giving informed consent.  The site of surgery was properly noted/marked if necessary per policy. The patient has been actively warmed in preoperative area. Preoperative antibiotics have been ordered and given within 1 hours of incision. Venous thrombosis prophylaxis have been ordered including unilateral sequential compression device    Procedure Details:   DESCRIPTION OF PROCEDURE: The patient was met in the preoperative holding area, where the operative limb was signed and all questions were answered. Preoperative antibiotics were administered. The patient was then taken back to the main operating room, placed supine on the table. The patient underwent a light general anesthetic without complication. The patient was then secured to the table and then all bony prominences were appropriately padded. A well-padded tourniquet was then placed high on the operative thigh. The operative lower extremity was then prepped and draped in the usual sterile orthopedic fashion. A surgical time-out was performed to confirm the correct patient, procedure, extremity, and laterality.    left KNEE  ARTHROSCOPY WITH DEBRIDEMENT: Arthroscopy was performed using two portals placed anterolaterally followed by anteromedially under direct visualization using a #11-blade. A complete diagnostic arthroscopy was performed with findings as noted below. We visualized and probed and checked all the medial and lateral meniscus structures and cartilage surfaces, and assessed all compartments. The medial meniscus was noted to have a complex tear pattern with horizontal tearing in the white-white junction. Additionally there was flipped areas of torn meniscus found extending into the medial gutter. There was no injury to the lateral meniscus. The medial, lateral, and patellofemoral compartments were without injury. The ACL and the PCL were intact.    PARTIAL MEDIAL MENISCECTOMY: Attention was turned to the medial compartment. There was a complex tear pattern with horizontal tearing in the white-white junction. Additionally there was flipped areas of torn meniscus found extending into the medial gutter that was not amenable to repair. This was debrided with a meniscus biter and a shaver to debride the posterior horn back to a stable rim. Afterwards the posterior horn was stable with no flaps.    CLOSING: The portals were all closed with a buried 3-0 Monocryl stitch. The incisions were then dried and covered with Deramond followed by padded tegaderms. An Ace wrap was then placed to the lower extremity. The patient was awoken from anesthesia without complication, taken to PACU in stable condition. All lap and sharp counts were correct at the end of the case.      Complications:  None; patient tolerated the procedure well.    Disposition: PACU - hemodynamically stable.  Condition: stable     Additional Details: None    Attending Attestation: I was present and scrubbed for the key portions of the procedure.    Ryan Miller  Phone Number: [phone]

## 2024-11-12 ENCOUNTER — APPOINTMENT (OUTPATIENT)
Dept: ORTHOPEDIC SURGERY | Facility: CLINIC | Age: 44
End: 2024-11-12
Payer: COMMERCIAL

## 2024-11-12 DIAGNOSIS — Z98.890 STATUS POST MENISCECTOMY: Primary | ICD-10-CM

## 2024-11-12 PROCEDURE — 99024 POSTOP FOLLOW-UP VISIT: CPT | Performed by: STUDENT IN AN ORGANIZED HEALTH CARE EDUCATION/TRAINING PROGRAM

## 2024-11-12 ASSESSMENT — PAIN SCALES - GENERAL: PAINLEVEL_OUTOF10: 3

## 2024-11-12 ASSESSMENT — PAIN - FUNCTIONAL ASSESSMENT: PAIN_FUNCTIONAL_ASSESSMENT: 0-10

## 2024-11-12 ASSESSMENT — PAIN DESCRIPTION - DESCRIPTORS: DESCRIPTORS: TIGHTNESS

## 2024-11-12 NOTE — PROGRESS NOTES
PRIMARY CARE PHYSICIAN: No primary care provider on file.    ORTHOPAEDIC POSTOPERATIVE VISIT    ASSESSMENT & PLAN    Impression: 44 y.o. male 13 days postop s/p left knee arthroscopic partial medial meniscectomy.  Overall, the patient is recovering well.    Plan:   At this time, the patient is weightbearing as tolerated.  We discussed the need for potential physical therapy which she would like to consider, however will work on range of motion and strengthening on his own at this time.  He is no longer required to take aspirin for DVT prophylaxis.    I reviewed the intraoperative findings with the patient and answered all their questions. I reviewed their postoperative timeline and plan with them. They understand the postoperative precautions and the treatment plan going forward.     Follow-Up: Patient will follow-up in 4 weeks.    At the end of the visit, all questions were answered in full. The patient is in agreement with the plan and recommendations. They will call the office with any questions/concerns.    Note dictated with Yoostay software. Completed without full typed error editing and sent to avoid delay.    SUBJECTIVE  CHIEF COMPLAINT:   Chief Complaint   Patient presents with    Left Knee - Post-op     Sx 10/30/24 No pain just swollen feels tight.         HPI: Nawaf Way is a 44 y.o. patient. Nawaf Way is now 13 days postop status post left knee arthroscopic partial medial meniscectomy.  Overall, the patient is doing well.  He is no longer in pain and is not on any narcotic medication.  He reports he has some persistent swelling to the left knee which is improving.  He has been taking his aspirin for DVT prophylaxis as prescribed.  No reported issues with his surgical incisions.  He denies any persistent mechanical symptoms of clicking, locking or popping.  He is overall happy with his progress.    REVIEW OF SYSTEMS  Constitutional: See HPI for pain assessment, No  "significant weight loss, recent trauma  Cardiovascular: No chest pain, shortness of breath  Respiratory: No difficulty breathing, cough  Gastrointestinal: No nausea, vomiting, diarrhea, constipation  Musculoskeletal: Noted in HPI, positive for pain, restricted motion, stiffness  Integumentary: No rashes, easy bruising, redness   Neurological: no numbness or tingling in extremities, no gait disturbances   Psychiatric: No mood changes, memory changes, social issues  Heme/Lymph: no excessive swelling, easy bruising, excessive bleeding  ENT: No hearing changes  Eyes: No vision changes    CURRENT MEDICATIONS:   Current Outpatient Medications   Medication Sig Dispense Refill    acetaminophen (Tylenol Extra Strength) 500 mg tablet Take 2 tablets (1,000 mg) by mouth every 8 hours if needed for mild pain (1 - 3). 180 tablet 0    aspirin 325 mg EC tablet Take 1 tablet (325 mg) by mouth once daily. 14 tablet 0    aspirin 81 mg chewable tablet Chew 1 tablet (81 mg) 2 times a day for 28 days. 56 tablet 0    docusate sodium (Colace) 100 mg capsule Take 1 capsule (100 mg) by mouth 2 times a day. 60 capsule 0     No current facility-administered medications for this visit.        OBJECTIVE    PHYSICAL EXAM      12/2/2020    10:40 AM 10/30/2024     8:37 AM 10/30/2024    12:23 PM 10/30/2024    12:26 PM 10/30/2024    12:38 PM 10/30/2024    12:53 PM 10/30/2024     1:07 PM   Vitals   Systolic 108 129 106 106 114 116 115   Diastolic 70 86 56 56 78 76 84   Heart Rate 79 66 66 66 63 67 55   Temp  36 °C (96.8 °F) 36.1 °C (97 °F) 36.1 °C (97 °F)   36 °C (96.8 °F)   Resp  18 16 16 16 16 16   Height (in) 1.778 m (5' 10\") 1.778 m (5' 10\")        Weight (lb) 207.13 215.17        BMI 29.72 kg/m2 30.87 kg/m2        BSA (m2) 2.15 m2 2.2 m2           There is no height or weight on file to calculate BMI.    General: Well-appearing, no acute distress    Skin intact bilateral upper and lower extremities  No erythema  No warmth    Detailed examination " of left knee demonstrates:  Surgical incisions healing well, Steri-Strips placed today.  No erythema or warmth  No drainage  No ecchymosis  Mild effusion  Resolving swelling, minimal  Knee range of motion: 0-90 degrees without pain.  Patella mobility 1+ medial and lateral  Lower extremity motor grossly intact  L4-S1 sensation intact bilaterally  2+ DP/PT pulses bilaterally  Warm and well-perfused, brisk capillary refill    IMAGING:   No new imaging    Piter Miller MD, MPH  Attending Surgeon    Sports Medicine Orthopaedic Surgery  [facility]

## 2024-12-10 ENCOUNTER — APPOINTMENT (OUTPATIENT)
Dept: ORTHOPEDIC SURGERY | Facility: CLINIC | Age: 44
End: 2024-12-10
Payer: COMMERCIAL

## 2024-12-10 DIAGNOSIS — Z98.890 STATUS POST MENISCECTOMY: Primary | ICD-10-CM

## 2024-12-10 PROCEDURE — 99024 POSTOP FOLLOW-UP VISIT: CPT | Performed by: STUDENT IN AN ORGANIZED HEALTH CARE EDUCATION/TRAINING PROGRAM

## 2024-12-10 ASSESSMENT — PAIN - FUNCTIONAL ASSESSMENT: PAIN_FUNCTIONAL_ASSESSMENT: NO/DENIES PAIN

## 2024-12-10 NOTE — PROGRESS NOTES
PRIMARY CARE PHYSICIAN: No primary care provider on file.    ORTHOPAEDIC POSTOPERATIVE VISIT    ASSESSMENT & PLAN    Impression: 44 y.o. male 5 weeks and 6 days postop s/p left knee partial medial meniscectomy, overall doing well.    Plan:   He has no restrictions at this time.  He will return to activities as tolerated.    I reviewed the intraoperative findings with the patient and answered all their questions. I reviewed their postoperative timeline and plan with them. They understand the postoperative precautions and the treatment plan going forward.     Follow-Up: Patient will follow-up in 6 weeks.    At the end of the visit, all questions were answered in full. The patient is in agreement with the plan and recommendations. They will call the office with any questions/concerns.    Note dictated with [employer] software. Completed without full typed error editing and sent to avoid delay.    SUBJECTIVE  CHIEF COMPLAINT:   Chief Complaint   Patient presents with    Left Knee - Follow-up     Sx 10/30/24 states feeling good.         HPI: Nawaf Way is a 44 y.o. patient. Nawaf Way is now 5 weeks and 6 days postop status post left knee partial medial discectomy.  Overall, the patient is feeling well.  He is no longer on any narcotic medication.  His swelling has resolved.  No reported issues with his surgical incisions.    REVIEW OF SYSTEMS  Constitutional: See HPI for pain assessment, No significant weight loss, recent trauma  Cardiovascular: No chest pain, shortness of breath  Respiratory: No difficulty breathing, cough  Gastrointestinal: No nausea, vomiting, diarrhea, constipation  Musculoskeletal: Noted in HPI, positive for pain, restricted motion, stiffness  Integumentary: No rashes, easy bruising, redness   Neurological: no numbness or tingling in extremities, no gait disturbances   Psychiatric: No mood changes, memory changes, social issues  Heme/Lymph: no excessive swelling, easy  "bruising, excessive bleeding  ENT: No hearing changes  Eyes: No vision changes    CURRENT MEDICATIONS:   Current Outpatient Medications   Medication Sig Dispense Refill    aspirin 325 mg EC tablet Take 1 tablet (325 mg) by mouth once daily. (Patient not taking: Reported on 12/10/2024) 14 tablet 0     No current facility-administered medications for this visit.        OBJECTIVE    PHYSICAL EXAM      12/2/2020    10:40 AM 10/30/2024     8:37 AM 10/30/2024    12:23 PM 10/30/2024    12:26 PM 10/30/2024    12:38 PM 10/30/2024    12:53 PM 10/30/2024     1:07 PM   Vitals   Systolic 108 129 106 106 114 116 115   Diastolic 70 86 56 56 78 76 84   Heart Rate 79 66 66 66 63 67 55   Temp  36 °C (96.8 °F) 36.1 °C (97 °F) 36.1 °C (97 °F)   36 °C (96.8 °F)   Resp  18 16 16 16 16 16   Height 1.778 m (5' 10\") 1.778 m (5' 10\")        Weight (lb) 207.13 215.17        BMI 29.72 kg/m2 30.87 kg/m2        BSA (m2) 2.15 m2 2.2 m2           There is no height or weight on file to calculate BMI.    General: Well-appearing, no acute distress    Skin intact bilateral upper and lower extremities  No erythema  No warmth    Detailed examination of left knee demonstrates:  Surgical incisions healing well.  No erythema or warmth  No drainage  No ecchymosis  No effusion  Resolving swelling, minimal  Knee range of motion: 0-120 degrees without pain  Mild to moderate early quadriceps inhibition and trace atrophy  Patella mobility 1+ medial and lateral  Lower extremity motor grossly intact  L4-S1 sensation intact bilaterally  2+ DP/PT pulses bilaterally  Warm and well-perfused, brisk capillary refill    IMAGING:   No new imaging    Piter Miller MD, MPH  Attending Surgeon    Sports Medicine Orthopaedic Surgery  [facility]   "

## 2025-01-21 ENCOUNTER — APPOINTMENT (OUTPATIENT)
Dept: ORTHOPEDIC SURGERY | Facility: CLINIC | Age: 45
End: 2025-01-21
Payer: COMMERCIAL

## (undated) DEVICE — DRESSING, MOISTURE VAPOR PERMEABLE, TEGADERM, 2X2-3/4IN, TRANSPARENT

## (undated) DEVICE — GOWN, SMARTSLEEVE, XXL, X-LONG

## (undated) DEVICE — BANDAGE, ELASTIC, ACE, SELF-CLOSURE, 6 IN X 5 YD, STERILE

## (undated) DEVICE — TUBING, PUMP REDEUCE 8FT STERILE

## (undated) DEVICE — SUTURE, MONOCRYL, 3-0, 18 IN, PS2, UNDYED

## (undated) DEVICE — DRAPE, TOWEL, STERI DRAPE, 17 X 11 IN, PLASTIC, STERILE

## (undated) DEVICE — BANDAGE, ESMARK, 6 IN X 9 FT, STERILE

## (undated) DEVICE — TOWEL, SURGICAL, NEURO, O/R, 16 X 26, BLUE, STERILE

## (undated) DEVICE — ADHESIVE, SKIN, DERMABOND ADVANCED, 15CM, PEN-STYLE

## (undated) DEVICE — BLADE, ORBIT, SYNOVATOR, EP-1, 4.5MM

## (undated) DEVICE — MAT, FLOOR, SURGISAFE, FLUID CONTROL, 46X40

## (undated) DEVICE — TUBING, PATIENT 8FT STERILE

## (undated) DEVICE — Device

## (undated) DEVICE — SUTURE, VICRYL, 2-0, 18 IN, CT-2, VIOLET

## (undated) DEVICE — DRAPE, SHEET, FAN FOLDED, HALF, 44 X 58 IN, DISPOSABLE, LF, STERILE

## (undated) DEVICE — GLOVE, SURGICAL, PROTEXIS PI MICRO, 8.0, PF, LF

## (undated) DEVICE — ARTHROWAND, AMBIENT SUPER MULTIVAC 50

## (undated) DEVICE — CUFF, TOURNIQUET, 30 X 4, DUAL PORT/SNGL BLADDER, DISP, LF

## (undated) DEVICE — APPLICATOR, CHLORAPREP, W/ORANGE TINT, 26ML

## (undated) DEVICE — GLOVE, SURGICAL, PROTEXIS PI BLUE W/NEUTHERA, 8.5, PF, LF